# Patient Record
Sex: MALE | Race: BLACK OR AFRICAN AMERICAN | Employment: OTHER | URBAN - METROPOLITAN AREA
[De-identification: names, ages, dates, MRNs, and addresses within clinical notes are randomized per-mention and may not be internally consistent; named-entity substitution may affect disease eponyms.]

---

## 2022-12-11 ENCOUNTER — APPOINTMENT (OUTPATIENT)
Dept: CT IMAGING | Age: 52
End: 2022-12-11
Attending: EMERGENCY MEDICINE
Payer: COMMERCIAL

## 2022-12-11 ENCOUNTER — HOSPITAL ENCOUNTER (EMERGENCY)
Age: 52
Discharge: LEFT BEFORE DISCHARGE | End: 2022-12-11
Attending: EMERGENCY MEDICINE
Payer: COMMERCIAL

## 2022-12-11 VITALS
RESPIRATION RATE: 18 BRPM | WEIGHT: 203.93 LBS | BODY MASS INDEX: 36.13 KG/M2 | SYSTOLIC BLOOD PRESSURE: 165 MMHG | DIASTOLIC BLOOD PRESSURE: 86 MMHG | OXYGEN SATURATION: 94 % | HEART RATE: 82 BPM | HEIGHT: 63 IN | TEMPERATURE: 98.6 F

## 2022-12-11 DIAGNOSIS — T78.3XXA ANGIOEDEMA, INITIAL ENCOUNTER: Primary | ICD-10-CM

## 2022-12-11 DIAGNOSIS — K11.5 SALIVARY STONE: ICD-10-CM

## 2022-12-11 LAB
ANION GAP SERPL CALC-SCNC: 11 MMOL/L (ref 5–15)
BASOPHILS # BLD: 0 K/UL
BASOPHILS NFR BLD: 0 %
BUN SERPL-MCNC: 16 MG/DL (ref 6–20)
BUN/CREAT SERPL: 13 (ref 12–20)
CALCIUM SERPL-MCNC: 9.8 MG/DL (ref 8.6–10)
CHLORIDE SERPL-SCNC: 99 MMOL/L (ref 98–107)
CO2 SERPL-SCNC: 29 MMOL/L (ref 22–29)
CREAT SERPL-MCNC: 1.22 MG/DL (ref 0.7–1.2)
DIFFERENTIAL METHOD BLD: ABNORMAL
EOSINOPHIL # BLD: 0 K/UL
EOSINOPHIL NFR BLD: 0 %
ERYTHROCYTE [DISTWIDTH] IN BLOOD BY AUTOMATED COUNT: 13.5 % (ref 11.5–14.5)
GLUCOSE SERPL-MCNC: 106 MG/DL (ref 65–100)
HCT VFR BLD AUTO: 38.7 % (ref 36.6–50.3)
HGB BLD-MCNC: 12.8 G/DL (ref 12.1–17)
IMM GRANULOCYTES # BLD AUTO: 0 K/UL
IMM GRANULOCYTES NFR BLD AUTO: 0 %
LYMPHOCYTES # BLD: 2.4 K/UL
LYMPHOCYTES NFR BLD: 7 %
MCH RBC QN AUTO: 31.6 PG (ref 26–34)
MCHC RBC AUTO-ENTMCNC: 33.1 G/DL (ref 30–36.5)
MCV RBC AUTO: 95.6 FL (ref 80–99)
MONOCYTES # BLD: 2.4 K/UL
MONOCYTES NFR BLD: 7 %
NEUTS SEG # BLD: 29 K/UL
NEUTS SEG NFR BLD: 86 %
NRBC # BLD: 0 K/UL (ref 0–0.01)
NRBC BLD-RTO: 0 PER 100 WBC
PLATELET # BLD AUTO: 228 K/UL (ref 150–400)
PLATELET COMMENTS,PCOM: ABNORMAL
PMV BLD AUTO: 11.4 FL (ref 8.9–12.9)
POTASSIUM SERPL-SCNC: 4.1 MMOL/L (ref 3.5–5.1)
RBC # BLD AUTO: 4.05 M/UL (ref 4.1–5.7)
RBC MORPH BLD: ABNORMAL
SODIUM SERPL-SCNC: 139 MMOL/L (ref 136–145)
WBC # BLD AUTO: 33.8 K/UL (ref 4.1–11.1)
WBC MORPH BLD: ABNORMAL

## 2022-12-11 PROCEDURE — 99285 EMERGENCY DEPT VISIT HI MDM: CPT

## 2022-12-11 PROCEDURE — 36415 COLL VENOUS BLD VENIPUNCTURE: CPT

## 2022-12-11 PROCEDURE — 85025 COMPLETE CBC W/AUTO DIFF WBC: CPT

## 2022-12-11 PROCEDURE — 74011000258 HC RX REV CODE- 258: Performed by: EMERGENCY MEDICINE

## 2022-12-11 PROCEDURE — 96374 THER/PROPH/DIAG INJ IV PUSH: CPT

## 2022-12-11 PROCEDURE — 74011000636 HC RX REV CODE- 636: Performed by: EMERGENCY MEDICINE

## 2022-12-11 PROCEDURE — 96372 THER/PROPH/DIAG INJ SC/IM: CPT

## 2022-12-11 PROCEDURE — 74011250636 HC RX REV CODE- 250/636: Performed by: EMERGENCY MEDICINE

## 2022-12-11 PROCEDURE — 96375 TX/PRO/DX INJ NEW DRUG ADDON: CPT

## 2022-12-11 PROCEDURE — 70491 CT SOFT TISSUE NECK W/DYE: CPT

## 2022-12-11 PROCEDURE — 94640 AIRWAY INHALATION TREATMENT: CPT

## 2022-12-11 PROCEDURE — 80048 BASIC METABOLIC PNL TOTAL CA: CPT

## 2022-12-11 PROCEDURE — 74011000250 HC RX REV CODE- 250: Performed by: EMERGENCY MEDICINE

## 2022-12-11 PROCEDURE — 96365 THER/PROPH/DIAG IV INF INIT: CPT

## 2022-12-11 PROCEDURE — 96361 HYDRATE IV INFUSION ADD-ON: CPT

## 2022-12-11 RX ORDER — PREDNISONE 50 MG/1
50 TABLET ORAL DAILY
Qty: 5 TABLET | Refills: 0 | Status: SHIPPED | OUTPATIENT
Start: 2022-12-11 | End: 2022-12-16

## 2022-12-11 RX ORDER — FAMOTIDINE 20 MG/1
20 TABLET, FILM COATED ORAL 2 TIMES DAILY
Qty: 10 TABLET | Refills: 0 | Status: SHIPPED | OUTPATIENT
Start: 2022-12-11 | End: 2022-12-16

## 2022-12-11 RX ORDER — DIPHENHYDRAMINE HYDROCHLORIDE 50 MG/ML
50 INJECTION, SOLUTION INTRAMUSCULAR; INTRAVENOUS ONCE
Status: COMPLETED | OUTPATIENT
Start: 2022-12-11 | End: 2022-12-11

## 2022-12-11 RX ORDER — TRANEXAMIC ACID 100 MG/ML
1 INJECTION, SOLUTION INTRAVENOUS ONCE
Status: COMPLETED | OUTPATIENT
Start: 2022-12-11 | End: 2022-12-11

## 2022-12-11 RX ORDER — EPINEPHRINE 0.3 MG/.3ML
0.3 INJECTION SUBCUTANEOUS
Qty: 1 EACH | Refills: 0 | Status: SHIPPED | OUTPATIENT
Start: 2022-12-11 | End: 2022-12-11

## 2022-12-11 RX ORDER — GLYCOPYRROLATE 0.2 MG/ML
0.2 INJECTION INTRAMUSCULAR; INTRAVENOUS ONCE
Status: DISCONTINUED | OUTPATIENT
Start: 2022-12-11 | End: 2022-12-11 | Stop reason: HOSPADM

## 2022-12-11 RX ORDER — EPINEPHRINE 1 MG/ML
0.3 INJECTION, SOLUTION, CONCENTRATE INTRAVENOUS
Status: COMPLETED | OUTPATIENT
Start: 2022-12-11 | End: 2022-12-11

## 2022-12-11 RX ORDER — AMOXICILLIN AND CLAVULANATE POTASSIUM 875; 125 MG/1; MG/1
1 TABLET, FILM COATED ORAL 2 TIMES DAILY
Qty: 20 TABLET | Refills: 0 | Status: SHIPPED | OUTPATIENT
Start: 2022-12-11 | End: 2022-12-21

## 2022-12-11 RX ORDER — DIPHENHYDRAMINE HCL 25 MG
50 TABLET ORAL 3 TIMES DAILY
Qty: 30 TABLET | Refills: 0 | Status: SHIPPED | OUTPATIENT
Start: 2022-12-11 | End: 2022-12-16

## 2022-12-11 RX ADMIN — EPINEPHRINE 0.3 MG: 1 INJECTION, SOLUTION, CONCENTRATE INTRAVENOUS at 08:11

## 2022-12-11 RX ADMIN — RACEPINEPHRINE HYDROCHLORIDE 0.5 ML: 11.25 SOLUTION RESPIRATORY (INHALATION) at 08:16

## 2022-12-11 RX ADMIN — DIPHENHYDRAMINE HYDROCHLORIDE 50 MG: 50 INJECTION, SOLUTION INTRAMUSCULAR; INTRAVENOUS at 08:13

## 2022-12-11 RX ADMIN — METHYLPREDNISOLONE SODIUM SUCCINATE 125 MG: 125 INJECTION, POWDER, FOR SOLUTION INTRAMUSCULAR; INTRAVENOUS at 08:16

## 2022-12-11 RX ADMIN — IOPAMIDOL 100 ML: 612 INJECTION, SOLUTION INTRAVENOUS at 09:05

## 2022-12-11 RX ADMIN — PIPERACILLIN AND TAZOBACTAM 4.5 G: 4; .5 INJECTION, POWDER, LYOPHILIZED, FOR SOLUTION INTRAVENOUS at 10:01

## 2022-12-11 RX ADMIN — TRANEXAMIC ACID 1000 MG: 1 INJECTION, SOLUTION INTRAVENOUS at 08:14

## 2022-12-11 RX ADMIN — SODIUM CHLORIDE, PRESERVATIVE FREE 20 MG: 5 INJECTION INTRAVENOUS at 08:15

## 2022-12-11 RX ADMIN — SODIUM CHLORIDE 1000 ML: 9 INJECTION, SOLUTION INTRAVENOUS at 08:12

## 2022-12-11 NOTE — ED TRIAGE NOTES
Patient arrives with new onset of tongue swelling with difficulty breathing. States began yesterday with a prior hx of similar episode a few weeks ago just involving the tip of his tongue.

## 2022-12-11 NOTE — DISCHARGE INSTRUCTIONS
YOU WERE SEEN IN THE ER FOR SEVERE FACE AND TONGUE SWELLING. YOU UNDERSTAND THE RISKS OF LEAVING AGAINST MEDICAL ADVICE. YOU UNDERSTAND THAT YOU COULD LOSE YOUR AIRWAY AND DIE.

## 2022-12-11 NOTE — ED NOTES
TRANSFER - OUT REPORT:Verbal report given to [facility]) on Basom Route  being transferred to [facility] ED(unit) for routine progression of care Report consisted of patients Situation, Background, Assessment and Recommendations(SBAR). Information from the following report(s) SBAR and ED Summary was reviewed with the receiving nurse. Opportunity for questions and clarification was provided.     Patient transported with:   Monitor CASSANDRA ALAN

## 2022-12-11 NOTE — ED PROVIDER NOTES
52M w/ hx HTN p/w 1d tongue swelling. Pt walked into ED today after started developing left sided neck and then tongue swelling last night around midnight. This happened a few weeks ago and he took [place] lot of benadryl\" and it went away. He denies any pain anywhere including no neck or head pain. No rash, itching, N/V/D, F/C. Reports mild dyspnea and difficulty swallowing w/ voice changes. He denies any new medications, foods, exposures. Not on ACEi/ARB. 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       Past Surgical History:   Procedure Laterality Date    NEUROLOGICAL PROCEDURE UNLISTED  2006    LLE nerve injury - [facility]         Family History:   Problem Relation Age of Onset    Stroke Father        Social History     Socioeconomic History    Marital status: LEGALLY      Spouse name: Not on file    Number of children: Not on file    Years of education: Not on file    Highest education level: Not on file   Occupational History    Not on file   Tobacco Use    Smoking status: Never    Smokeless tobacco: Never   Substance and Sexual Activity    Alcohol use: Yes     Comment: \"maybe once a month\"    Drug use: No    Sexual activity: Not on file   Other Topics Concern    Not on file   Social History Narrative    Not on file     Social Determinants of Health     Financial Resource Strain: Not on file   Food Insecurity: Not on file   Transportation Needs: Not on file   Physical Activity: Not on file   Stress: Not on file   Social Connections: Not on file   Intimate Partner Violence: Not on file   Housing Stability: Not on file         ALLERGIES: Diovan [valsartan], Lotrel [amlodipine-benazepril], and Micardis [telmisartan]    Review of Systems   Constitutional:  Negative for chills, diaphoresis and fever. HENT:  Positive for facial swelling. Negative for mouth sores, nosebleeds, trouble swallowing and voice change.     Eyes:  Negative for pain and visual disturbance. Respiratory:  Negative for apnea, cough, shortness of breath, wheezing and stridor. Cardiovascular:  Negative for chest pain, palpitations and leg swelling. Gastrointestinal:  Negative for abdominal distention, abdominal pain, blood in stool, diarrhea, nausea and vomiting. Genitourinary:  Negative for difficulty urinating, dysuria, flank pain, hematuria, scrotal swelling and testicular pain. Musculoskeletal:  Negative for joint swelling. Skin:  Negative for color change and rash. Allergic/Immunologic: Negative for immunocompromised state. Neurological:  Negative for dizziness, syncope and light-headedness. Hematological:  Does not bruise/bleed easily. Psychiatric/Behavioral:  Negative for agitation and behavioral problems. Vitals:    12/11/22 0758   Pulse: 98   SpO2: 97%   Weight: 92.5 kg (203 lb 14.8 oz)            Physical Exam  Vitals and nursing note reviewed. Constitutional:       General: He is not in acute distress. Appearance: Normal appearance. He is not ill-appearing or toxic-appearing. HENT:      Head: Normocephalic and atraumatic. Right Ear: External ear normal.      Left Ear: External ear normal.      Nose: Nose normal.      Mouth/Throat:      Mouth: Mucous membranes are moist.      Pharynx: Oropharynx is clear. No oropharyngeal exudate or posterior oropharyngeal erythema. Comments: Asymmetric edematous tongue  Left sided sublingual and submandibular edema  Full neck ROM  Muffled voice but speaking in full sentences w/o drooling, stridor, tripoding and tolerating his secretions  Clear posterior oropharynx including uvula  Eyes:      General: No scleral icterus. Extraocular Movements: Extraocular movements intact. Conjunctiva/sclera: Conjunctivae normal.      Pupils: Pupils are equal, round, and reactive to light. Cardiovascular:      Rate and Rhythm: Normal rate and regular rhythm. Pulses: Normal pulses.       Heart sounds: No murmur heard. No friction rub. No gallop. Pulmonary:      Effort: Pulmonary effort is normal. No respiratory distress. Breath sounds: Normal breath sounds. No stridor. No wheezing, rhonchi or rales. Abdominal:      General: Bowel sounds are normal. There is no distension. Palpations: Abdomen is soft. Tenderness: There is no abdominal tenderness. There is no guarding or rebound. Musculoskeletal:         General: No deformity. Normal range of motion. Cervical back: Normal range of motion and neck supple. No rigidity. Right lower leg: No edema. Left lower leg: No edema. Skin:     General: Skin is warm. Capillary Refill: Capillary refill takes less than 2 seconds. Coloration: Skin is not jaundiced. Neurological:      General: No focal deficit present. Mental Status: He is alert. Cranial Nerves: No cranial nerve deficit. Sensory: No sensory deficit. Motor: No weakness. Coordination: Coordination normal.   Psychiatric:         Mood and Affect: Mood normal.         Behavior: Behavior normal.         Thought Content:  Thought content normal.         Judgment: Judgment normal.      LABORATORY TESTS:  Admission on 12/11/2022, Discharged on 12/11/2022   Component Date Value Ref Range Status    WBC 12/11/2022 33.8 (A)  4.1 - 11.1 K/uL Final    RBC 12/11/2022 4.05 (A)  4.10 - 5.70 M/uL Final    HGB 12/11/2022 12.8  12.1 - 17.0 g/dL Final    HCT 12/11/2022 38.7  36.6 - 50.3 % Final    MCV 12/11/2022 95.6  80.0 - 99.0 FL Final    MCH 12/11/2022 31.6  26.0 - 34.0 PG Final    MCHC 12/11/2022 33.1  30.0 - 36.5 g/dL Final    RDW 12/11/2022 13.5  11.5 - 14.5 % Final    PLATELET 64/57/4352 316  150 - 400 K/uL Final    MPV 12/11/2022 11.4  8.9 - 12.9 FL Final    NRBC 12/11/2022 0.0  0  WBC Final    ABSOLUTE NRBC 12/11/2022 0.00  0.00 - 0.01 K/uL Final    NEUTROPHILS 12/11/2022 86  % Final    LYMPHOCYTES 12/11/2022 7  % Final    MONOCYTES 12/11/2022 7 % Final    EOSINOPHILS 12/11/2022 0  % Final    BASOPHILS 12/11/2022 0  % Final    IMMATURE GRANULOCYTES 12/11/2022 0  % Final    ABS. NEUTROPHILS 12/11/2022 29.0  K/UL Final    ABS. LYMPHOCYTES 12/11/2022 2.4  K/UL Final    ABS. MONOCYTES 12/11/2022 2.4  K/UL Final    ABS. EOSINOPHILS 12/11/2022 0.0  K/UL Final    ABS. BASOPHILS 12/11/2022 0.0  K/UL Final    ABS. IMM. GRANS. 12/11/2022 0.0  K/UL Final    DF 12/11/2022 MANUAL    Final    PLATELET COMMENTS 92/02/6137 Large Platelets    Final    PRESENT    RBC COMMENTS 12/11/2022 NORMOCYTIC, NORMOCHROMIC    Final    WBC COMMENTS 12/11/2022 VACUOLATED POLYS    Final    PRESENT    Sodium 12/11/2022 139  136 - 145 mmol/L Final    Potassium 12/11/2022 4.1  3.5 - 5.1 mmol/L Final    Chloride 12/11/2022 99  98 - 107 mmol/L Final    CO2 12/11/2022 29  22 - 29 mmol/L Final    Anion gap 12/11/2022 11  5 - 15 mmol/L Final    Glucose 12/11/2022 106 (A)  65 - 100 mg/dL Final    BUN 12/11/2022 16  6 - 20 MG/DL Final    Creatinine 12/11/2022 1.22 (A)  0.70 - 1.20 MG/DL Final    BUN/Creatinine ratio 12/11/2022 13  12 - 20   Final    eGFR 12/11/2022 >60  >60 ml/min/1.73m2 Final    Comment:      Pediatric calculator link: [facility].at. org/professionals/kdoqi/gfr_calculatorped       These results are not intended for use in patients <25years of age. eGFR results are calculated without a race factor using  the 2021 CKD-EPI equation. Careful clinical correlation is recommended, particularly when comparing to results calculated using previous equations. The CKD-EPI equation is less accurate in patients with extremes of muscle mass, extra-renal metabolism of creatinine, excessive creatine ingestion, or following therapy that affects renal tubular secretion. Calcium 12/11/2022 9.8  8.6 - 10.0 MG/DL Final       IMAGING RESULTS:  CT NECK SOFT TISSUE W CONT   Final Result      1. Left submandibular sialoadenitis due to tiny calculi in the left   submandibular duct. Surrounding inflammation and reactive lymphadenopathy. 2. No abscess. No extension of infection into the deep cervical soft tissues. 3. Gas in the oral cavity most likely represents secretions. Gaseous soft tissue   infection is less likely. 4. Left maxillary dental disease. MEDICATIONS GIVEN:  Medications   methylPREDNISolone (PF) (Solu-MEDROL) injection 125 mg (125 mg IntraVENous Given 12/11/22 0816)   diphenhydrAMINE (BENADRYL) injection 50 mg (50 mg IntraVENous Given 12/11/22 0813)   famotidine (PF) (PEPCID) 20 mg in 0.9% sodium chloride 10 mL injection (20 mg IntraVENous Given 12/11/22 0815)   tranexamic acid (CYKLOKAPRON) injection 1,000 mg (1,000 mg IntraVENous Given 12/11/22 0814)   racEPINEPHrine (VAPONEFRIN) 2.25% nebulizer solution (0.5 mL Nebulization Given 12/11/22 0816)   EPINEPHrine HCl (PF) (ADRENALIN) 1 mg/mL (1 mL) injection 0.3 mg (0.3 mg IntraMUSCular Given 12/11/22 0811)   sodium chloride 0.9 % bolus infusion 1,000 mL (0 mL IntraVENous IV Completed 12/11/22 1053)   iopamidoL (ISOVUE 300) 300 mg iodine /mL (61 %) contrast injection 100 mL (100 mL IntraVENous Given 12/11/22 0905)   piperacillin-tazobactam (ZOSYN) 4.5 g in 0.9% sodium chloride (MBP/ADV) 100 mL MBP (0 g IntraVENous IV Completed 12/11/22 1054)       IMPRESSION:  1. Angioedema, initial encounter    2. Salivary stone        PLAN:  -  Sylvain Mcarthur MD      [facility]  Number of Diagnoses or Management Options  Angioedema, initial encounter  Salivary stone  Diagnosis management comments: 52M w/ hx HTN p/w 1d tongue and facial swelling. Pt unwell appearing but calm and walked in. Has extensive edema to tongue as well as sublingual and submandibular space. Muffled voice and trismus but no tripoding, drooling and speaking in full sentences. Clear posterior oropharynx. Sats 94-95% on RA. Ddx includes angioedema vs anaphylaxis vs deep space neck infection.  Currently protecting airway but low threshold to intubate if deteriorates. Ordered IVF, IM and nebulized epi, IV TXA, solumedrol, pepcid, benadryl. Will cover w/ zosyn. Also labs and CT neck w/ soft tissue. Closely reassess. 0900 Pt states feels better and marginally decreased edema evidence on exam but does not appear to be progressing. Labs show marked leukocytosis and MAURA. CT neck showing left sialoadenitis w/ reactive lymphadenopathy but no soft tissue gas or fluid collection. I spoke w/ [facility] (Dr Jennifer Brito) who agrees to see pt in ED. I also spoke w/ [facility] ED attending who will accept pt. Pt was initially ok w/ transfer but after crew arrived, he changed his mind. Myself as well as RN and paramedic had extensive conversations w/ pt and urged him to agree to transfer so can be observed and evaluated by ENT. We all carefully explained risks/benefits including potentially loss of airway from worsening edema leading to intubation, tracheal injury, death. Pt very resistant and continues to refuse transfer. He agreed to accept script for augmentin and prednisone but I explained that these may not help or prevent worsening of his condition. Pt understands that he is leaving [address] The pt also specifically understands the risks of leaving before work up is complete which include deterioration of his medical condition as well as disability or even death. Pt has medical decision making capacity in my opinion. We encouraged the pt to return to ED at any time to complete work up and also advised to follow up with his PCP over next few days. Pt walked into ED today after started developing left sided neck and then tongue swelling last night around midnight. This happened a few weeks ago and he took [place] lot of benadryl\" and it went away. He denies any pain anywhere including no neck or head pain. No rash, itching, N/V/D, F/C. Reports mild dyspnea and difficulty swallowing w/ voice changes. He denies any new medications, foods, exposures. Not on ACEi/ARB. Amount and/or Complexity of Data Reviewed  Clinical lab tests: reviewed and ordered  Tests in the radiology section of CPT®: ordered and reviewed  Tests in the medicine section of CPT®: ordered and reviewed  Discussion of test results with the performing providers: yes  Decide to obtain previous medical records or to obtain history from someone other than the patient: yes  Obtain history from someone other than the patient: yes  Review and summarize past medical records: yes  Discuss the patient with other providers: yes  Independent visualization of images, tracings, or specimens: yes    Risk of Complications, Morbidity, and/or Mortality  Presenting problems: high  Diagnostic procedures: high  Management options: high           Critical Care  Performed by: Missy Bartlett MD  Authorized by: Missy Bartlett MD     Critical care provider statement:     Critical care time (minutes):  105    Critical care was necessary to treat or prevent imminent or life-threatening deterioration of the following conditions:  Respiratory failure, circulatory failure and sepsis    Critical care was time spent personally by me on the following activities:  Blood draw for specimens, development of treatment plan with patient or surrogate, discussions with consultants, discussions with primary provider, evaluation of patient's response to treatment, examination of patient, interpretation of cardiac output measurements, obtaining history from patient or surrogate, review of old charts, re-evaluation of patient's condition, pulse oximetry, ordering and review of radiographic studies, ordering and review of laboratory studies and ordering and performing treatments and interventions      Total critical care time (not including time spent performing separately reportable procedures): 105

## 2022-12-11 NOTE — ED NOTES
When [facility] at bedside, pt stated he no longer wanted to go and wished to be discharged home. Risks of refusing transfer and further treatment provided by physician and [facility]. Pt A&Ox4. AMA paperwork signed.

## 2022-12-11 NOTE — ED NOTES
Pt arrived c/o L neck swelling and increased difficulty breathing starting yesterday evening. Pt tongue and neck swollen on assessment. Per pt, this happened approximately 1 week ago, starting with \"tingling at the tip of tongue\" and was resolved with \"a few days of benadryl\".

## 2024-08-19 ENCOUNTER — OFFICE VISIT (OUTPATIENT)
Age: 54
End: 2024-08-19

## 2024-08-19 VITALS
DIASTOLIC BLOOD PRESSURE: 120 MMHG | TEMPERATURE: 98.7 F | HEART RATE: 71 BPM | RESPIRATION RATE: 18 BRPM | WEIGHT: 182 LBS | BODY MASS INDEX: 32.24 KG/M2 | OXYGEN SATURATION: 96 % | SYSTOLIC BLOOD PRESSURE: 176 MMHG

## 2024-08-19 DIAGNOSIS — I10 PRIMARY HYPERTENSION: ICD-10-CM

## 2024-08-19 DIAGNOSIS — J02.9 SORE THROAT: ICD-10-CM

## 2024-08-19 DIAGNOSIS — U07.1 COVID-19: Primary | ICD-10-CM

## 2024-08-19 LAB
INFLUENZA A ANTIGEN, POC: NEGATIVE
INFLUENZA B ANTIGEN, POC: NEGATIVE
Lab: ABNORMAL
QC PASS/FAIL: ABNORMAL
SARS-COV-2, POC: DETECTED
STREP PYOGENES DNA, POC: NEGATIVE
VALID INTERNAL CONTROL, POC: YES
VALID INTERNAL CONTROL, POC: YES

## 2024-08-19 RX ORDER — AMLODIPINE BESYLATE 10 MG/1
10 TABLET ORAL DAILY
COMMUNITY

## 2024-08-19 ASSESSMENT — ENCOUNTER SYMPTOMS
ALLERGIC/IMMUNOLOGIC NEGATIVE: 1
EYES NEGATIVE: 1
GASTROINTESTINAL NEGATIVE: 1
COUGH: 1
RHINORRHEA: 1
SORE THROAT: 1

## 2024-08-19 NOTE — PROGRESS NOTES
NEUROLOGICAL SURGERY  2006    LLE nerve injury - [facility]        Social History:   Social Connections: Not on file        Patient Care Team:  Maria Alejandra Woody PA as PCP - General    Patient Active Problem List   Diagnosis    Traumatic injury of common peroneal nerve    HTN (hypertension)      The exam did not reveal an ill appearing patient, a toxic appearing patient, respiratory distress, rash or dehydration.  The exam did not reveal altered mental status, listlessness or lethargy.      The patient presents within the treatment window and is a good candidate for treatment on an outpatient basis with oral Paxlovid given that the patient is tolerating PO.    The patient understands the possibility of COVID-associated complications including pneumonia and will follow up immediately with any worsening symptoms or changes.    Discharge decision based on the following:  clinical impression is consistent with outpatient treatment; patient's exam is stable, patient's condition is stable.  Patient agrees to go to the ER if symptoms worsen in any way, or develops any new symptoms.   I have discussed the results, diagnosis treatment plan wit the patient. The patient also understands that early in the process of an illness, the urgent care workup can be falsely reassuring. Routine discharge counseling and specific return precautions dicussed with the patient and the patient understand that worsening, changing or persistent symptoms should prompt an immediate return to an urgent care or emergency department. Patient/Guardian expressed understanding and agrees with the discharge instructions. No further questions at this time of discharge.      I ADVISED PATIENT TO GO TO ER IF SYMPTOMS WORSEN , CHANGE OR FAILS TO IMPROVE.    I have discussed the diagnosis with the patient and the intended plan as seen in the above orders.  The patient has received an after-visit summary and questions were answered concerning future plans.  I have

## 2024-08-19 NOTE — PATIENT INSTRUCTIONS
increase your chances of quitting for good.  Stay at a healthy weight.  Try to limit how much sodium you eat to less than 2,300 milligrams (mg) a day. Your doctor may ask you to try to eat less than 1,500 mg a day.  Be physically active. Get at least 30 minutes of exercise on most days of the week. Walking is a good choice. You also may want to do other activities, such as running, swimming, cycling, or playing tennis or team sports.  Avoid or limit alcohol. Talk to your doctor about whether you can drink any alcohol.  Eat plenty of fruits, vegetables, and low-fat dairy products. Eat less saturated and total fats.  Learn how to check your blood pressure at home.  When should you call for help?  Call your doctor now or seek immediate medical care if:    Your blood pressure is much higher than normal (such as 180/110 or higher).     You think high blood pressure is causing symptoms such as:  Severe headache.  Blurry vision.    Watch closely for changes in your health, and be sure to contact your doctor if:    You do not get better as expected.   Where can you learn more?  Go to [email or web address] and sign in to your [employer] account. Enter F644 in the Search Health Information box to learn more about \"Elevated Blood Pressure: Care Instructions.\"     If you do not have an account, please click on the \"Sign Up Now\" link.  Current as of: May 10, 2017  Content Version: 11.6  © 6501-7979 [employer]. Care instructions adapted under license by [employer]. If you have questions about a medical condition or this instruction, always ask your healthcare professional. [employer] disclaims any warranty or liability for your use of this information.

## 2025-02-05 ENCOUNTER — OFFICE VISIT (OUTPATIENT)
Age: 55
End: 2025-02-05

## 2025-02-05 VITALS
BODY MASS INDEX: 32.42 KG/M2 | HEART RATE: 88 BPM | WEIGHT: 183 LBS | SYSTOLIC BLOOD PRESSURE: 181 MMHG | RESPIRATION RATE: 16 BRPM | DIASTOLIC BLOOD PRESSURE: 137 MMHG | OXYGEN SATURATION: 98 % | TEMPERATURE: 98.5 F

## 2025-02-05 DIAGNOSIS — I10 UNCONTROLLED HYPERTENSION: ICD-10-CM

## 2025-02-05 DIAGNOSIS — Z76.0 MEDICATION REFILL: ICD-10-CM

## 2025-02-05 DIAGNOSIS — R09.89 CHRONIC SINUS COMPLAINTS: Primary | ICD-10-CM

## 2025-02-05 RX ORDER — AMLODIPINE BESYLATE 10 MG/1
10 TABLET ORAL DAILY
Qty: 10 TABLET | Refills: 0 | Status: SHIPPED | OUTPATIENT
Start: 2025-02-05

## 2025-02-05 RX ORDER — TRIAMTERENE AND HYDROCHLOROTHIAZIDE 37.5; 25 MG/1; MG/1
1 TABLET ORAL DAILY
Qty: 10 TABLET | Refills: 0 | Status: SHIPPED | OUTPATIENT
Start: 2025-02-05 | End: 2025-02-15

## 2025-02-05 RX ORDER — METHYLPREDNISOLONE 4 MG/1
TABLET ORAL
Qty: 1 KIT | Refills: 0 | Status: SHIPPED | OUTPATIENT
Start: 2025-02-05

## 2025-02-05 NOTE — PROGRESS NOTES
Patient Name: Anand Darnell   YOB: 1970   Patient Status: Established patient,   Chief Complaint: Cold Symptoms (Sinus congestion for a year)      ____________________________________________________________________________________________    External Records Reviewed: None    Limitation to History: None    Outside Historian: None    SUBJECTIVE/OBJECTIVE:  Anand Darnell is a 54 y.o. male presents with complaint of sinus congestion.  Symptoms began 1 year(s) ago and show no change since onset.  He reports he has to use nasal decongestant every day.  He says he has tried Flonase and Claritin in the past without relief so quit both of them.  He reports he waldrop been given antibiotics and steroids in the past with relief that lasts a week or two but that symptoms seem to come right back. The patient denies fever or sinus pain.  No other acute symptoms reported at this time. Patient reports that he has been out of his blood pressure medication for \"awhile\".  He reports he has a PCP and should be able to get into that office within a week or two.  He denies chest pain, shortness of breath, dizziness, headache, or change in vision.          PAST MEDICAL HISTORY:   Medical: Pt  has a past medical history of Hypertension, Low back pain, and Peroneal nerve injury (4/2006).  Surgical: Pt  has a past surgical history that includes neurological surgery (2006).  Family: Pt family history includes Stroke in his father.  Social: Pt   Social History     Socioeconomic History    Marital status: Legally      Spouse name: Not on file    Number of children: Not on file    Years of education: Not on file    Highest education level: Not on file   Occupational History    Not on file   Tobacco Use    Smoking status: Some Days     Types: Cigarettes    Smokeless tobacco: Never   Substance and Sexual Activity    Alcohol use: Yes    Drug use: No    Sexual activity: Not on file   Other Topics Concern    Not on file

## 2025-02-05 NOTE — PATIENT INSTRUCTIONS
Use saline (saltwater) nasal washes. This can help keep your nasal passages open and wash out mucus and allergens.  You can buy saline nose washes at a grocery store or drugstore. Follow the instructions on the package.  Try a decongestant nasal spray like oxymetazoline (Afrin) Do not use it for more than 3 days in a row. Using it for more than 3 days can make your congestion worse.  If needed, take an over-the-counter pain medicine, such as acetaminophen (Tylenol), ibuprofen (Advil, Motrin), or naproxen (Aleve). Read and follow all instructions on the label.  Be careful when taking over-the-counter cold or influenza (flu) medicines and Tylenol at the same time. Many of these medicines have acetaminophen, which is Tylenol. Read the labels to make sure that you are not taking more than the recommended dose. Too much acetaminophen (Tylenol) can be harmful.  Try a steroid nasal spray daily such as Flonase as well as antihistamine such as Claritin or Zyrtec.   Breathe warm, moist air. You can use a steamy shower, a hot bath, or a sink filled with hot water. Avoid cold, dry air. Using a humidifier in your home may help. Follow the directions for cleaning the machine.  Do not smoke or allow others to smoke around you. If you need help quitting, talk to your doctor about stop-smoking programs and medicines. These can increase your chances of quitting for good.  Return to Clinic if mild worsening or changes in symptoms.  Report to ER if high or persistent fever, dehydration, new or severe worsening of symptoms.  Please follow up with PCP for persistent or recurrent symptoms.